# Patient Record
Sex: MALE | Race: WHITE | HISPANIC OR LATINO | Employment: OTHER | URBAN - METROPOLITAN AREA
[De-identification: names, ages, dates, MRNs, and addresses within clinical notes are randomized per-mention and may not be internally consistent; named-entity substitution may affect disease eponyms.]

---

## 2017-02-23 ENCOUNTER — APPOINTMENT (EMERGENCY)
Dept: RADIOLOGY | Facility: HOSPITAL | Age: 72
End: 2017-02-23
Payer: COMMERCIAL

## 2017-02-23 ENCOUNTER — HOSPITAL ENCOUNTER (EMERGENCY)
Facility: HOSPITAL | Age: 72
Discharge: HOME | End: 2017-02-23
Payer: COMMERCIAL

## 2017-02-23 VITALS
RESPIRATION RATE: 17 BRPM | HEIGHT: 68 IN | DIASTOLIC BLOOD PRESSURE: 83 MMHG | WEIGHT: 175 LBS | OXYGEN SATURATION: 100 % | HEART RATE: 85 BPM | SYSTOLIC BLOOD PRESSURE: 167 MMHG | TEMPERATURE: 96.3 F | BODY MASS INDEX: 26.52 KG/M2

## 2017-02-23 DIAGNOSIS — S33.5XXA LUMBAR SPRAIN, INITIAL ENCOUNTER: Primary | ICD-10-CM

## 2017-02-23 PROCEDURE — 99283 EMERGENCY DEPT VISIT LOW MDM: CPT

## 2017-02-23 PROCEDURE — 72100 X-RAY EXAM L-S SPINE 2/3 VWS: CPT

## 2017-02-23 RX ORDER — OXYCODONE HYDROCHLORIDE AND ACETAMINOPHEN 5; 325 MG/1; MG/1
1 TABLET ORAL EVERY 6 HOURS PRN
Qty: 12 TABLET | Refills: 0 | Status: SHIPPED | OUTPATIENT
Start: 2017-02-23 | End: 2017-03-05

## 2017-02-23 RX ORDER — LIDOCAINE 50 MG/G
1 PATCH TOPICAL ONCE
Status: DISCONTINUED | OUTPATIENT
Start: 2017-02-23 | End: 2017-02-23 | Stop reason: HOSPADM

## 2017-02-23 RX ORDER — LIDOCAINE 50 MG/G
1 PATCH TOPICAL EVERY 24 HOURS
Qty: 15 PATCH | Refills: 0 | Status: SHIPPED | OUTPATIENT
Start: 2017-02-23 | End: 2018-04-02

## 2017-02-23 RX ADMIN — LIDOCAINE 1 PATCH: 50 PATCH CUTANEOUS at 09:59

## 2017-08-18 ENCOUNTER — HOSPITAL ENCOUNTER (EMERGENCY)
Facility: HOSPITAL | Age: 72
Discharge: HOME | End: 2017-08-18
Attending: EMERGENCY MEDICINE | Admitting: EMERGENCY MEDICINE
Payer: COMMERCIAL

## 2017-08-18 ENCOUNTER — APPOINTMENT (EMERGENCY)
Dept: RADIOLOGY | Facility: HOSPITAL | Age: 72
End: 2017-08-18
Payer: COMMERCIAL

## 2017-08-18 VITALS
DIASTOLIC BLOOD PRESSURE: 70 MMHG | HEART RATE: 80 BPM | BODY MASS INDEX: 27.28 KG/M2 | RESPIRATION RATE: 18 BRPM | OXYGEN SATURATION: 98 % | SYSTOLIC BLOOD PRESSURE: 163 MMHG | HEIGHT: 68 IN | WEIGHT: 180 LBS | TEMPERATURE: 96.4 F

## 2017-08-18 DIAGNOSIS — M25.569 KNEE PAIN: Primary | ICD-10-CM

## 2017-08-18 PROCEDURE — 73564 X-RAY EXAM KNEE 4 OR MORE: CPT | Performed by: EMERGENCY MEDICINE

## 2017-08-18 PROCEDURE — 99283 EMERGENCY DEPT VISIT LOW MDM: CPT

## 2017-08-18 RX ORDER — KETOROLAC TROMETHAMINE 30 MG/ML
15 INJECTION, SOLUTION INTRAMUSCULAR; INTRAVENOUS ONCE
Status: DISCONTINUED | OUTPATIENT
Start: 2017-08-18 | End: 2017-08-18 | Stop reason: HOSPADM

## 2018-04-02 ENCOUNTER — HOSPITAL ENCOUNTER (EMERGENCY)
Facility: HOSPITAL | Age: 73
Discharge: HOME | End: 2018-04-02
Payer: COMMERCIAL

## 2018-04-02 VITALS
HEART RATE: 83 BPM | RESPIRATION RATE: 18 BRPM | BODY MASS INDEX: 27.37 KG/M2 | DIASTOLIC BLOOD PRESSURE: 85 MMHG | SYSTOLIC BLOOD PRESSURE: 175 MMHG | TEMPERATURE: 97.4 F | OXYGEN SATURATION: 98 % | WEIGHT: 180 LBS

## 2018-04-02 DIAGNOSIS — L03.90 CELLULITIS: ICD-10-CM

## 2018-04-02 DIAGNOSIS — L02.91 ABSCESS: Primary | ICD-10-CM

## 2018-04-02 PROCEDURE — 99282 EMERGENCY DEPT VISIT SF MDM: CPT

## 2018-04-02 RX ORDER — CEPHALEXIN 500 MG/1
500 CAPSULE ORAL 4 TIMES DAILY
Qty: 28 CAPSULE | Refills: 0 | Status: SHIPPED | OUTPATIENT
Start: 2018-04-02 | End: 2018-04-04

## 2018-04-02 RX ORDER — SULFAMETHOXAZOLE AND TRIMETHOPRIM 800; 160 MG/1; MG/1
1 TABLET ORAL 2 TIMES DAILY
Qty: 14 TABLET | Refills: 0 | Status: SHIPPED | OUTPATIENT
Start: 2018-04-02 | End: 2018-04-04

## 2018-04-02 NOTE — DISCHARGE INSTRUCTIONS
Abscess   WHAT YOU NEED TO KNOW:   A warm compress may help your abscess drain  Your healthcare provider may make a cut in the abscess so it can drain  You may need surgery to remove an abscess that is on your hands or buttocks  DISCHARGE INSTRUCTIONS:   Return to the emergency department if:   · The area around your abscess becomes very painful, warm, or has red streaks  · You have a fever and chills  · Your heart is beating faster than usual      · You feel faint or confused  Contact your healthcare provider if:   · Your abscess gets bigger or does not get better  · Your abscess returns  · You have questions or concerns about your condition or care  Medicines: You may  need any of the following:  · Antibiotics  help treat a bacterial infection  · Acetaminophen  decreases pain and fever  It is available without a doctor's order  Ask how much to take and how often to take it  Follow directions  Acetaminophen can cause liver damage if not taken correctly  · NSAIDs , such as ibuprofen, help decrease swelling, pain, and fever  This medicine is available with or without a doctor's order  NSAIDs can cause stomach bleeding or kidney problems in certain people  If you take blood thinner medicine, always ask your healthcare provider if NSAIDs are safe for you  Always read the medicine label and follow directions  · Take your medicine as directed  Contact your healthcare provider if you think your medicine is not helping or if you have side effects  Tell him or her if you are allergic to any medicine  Keep a list of the medicines, vitamins, and herbs you take  Include the amounts, and when and why you take them  Bring the list or the pill bottles to follow-up visits  Carry your medicine list with you in case of an emergency  Self-care:   · Apply a warm compress to your abscess  This will help it open and drain  Wet a washcloth in warm, but not hot, water  Apply the compress for 10 minutes  Repeat this 4 times each day  Do not  press on an abscess or try to open it with a needle  You may push the bacteria deeper or into your blood  · Do not share your clothes, towels, or sheets with anyone  This can spread the infection to others  · Wash your hands often  This can help prevent the spread of germs  Use soap and water or an alcohol-based hand rub  Care for your wound after it is drained:   · Care for your wound as directed  If your healthcare provider says it is okay, carefully remove the bandage and gauze packing  You may need to soak the gauze to get it out of your wound  Clean your wound and the area around it as directed  Dry the area and put on new, clean bandages  Change your bandages when they get wet or dirty  · Ask your healthcare provider how to change the gauze in your wound  Keep track of how many pieces of gauze are placed inside the wound  Do not put too much packing in the wound  Do not pack the gauze too tightly in your wound  Follow up with your healthcare provider in 1 to 3 days: You may need to have your packing removed or your bandage changed  Write down your questions so you remember to ask them during your visits  © 2017 [facility] Information is for End User's use only and may not be sold, redistributed or otherwise used for commercial purposes  All illustrations and images included in CareNotes® are the copyrighted property of [employer] M [employer]  or [employer]  The above information is an  only  It is not intended as medical advice for individual conditions or treatments  Talk to your doctor, nurse or pharmacist before following any medical regimen to see if it is safe and effective for you  Cellulitis   WHAT YOU NEED TO KNOW:   Cellulitis is a skin infection caused by bacteria  Cellulitis may go away on its own or you may need treatment   Your healthcare provider may draw a [place] around the outside edges of your cellulitis  If your cellulitis spreads, your healthcare provider will see it outside of the [place]  DISCHARGE INSTRUCTIONS:   Call 911 if:   · You have sudden trouble breathing or chest pain  Return to the emergency department if:   · Your wound gets larger and more painful  · You feel a crackling under your skin when you touch it  · You have purple dots or bumps on your skin, or you see bleeding under your skin  · You have new swelling and pain in your legs  · The red, warm, swollen area gets larger  · You see red streaks coming from the infected area  Contact your healthcare provider if:   · You have a fever  · Your fever or pain does not go away or gets worse  · The area does not get smaller after 2 days of antibiotics  · Your skin is flaking or peeling off  · You have questions or concerns about your condition or care  Medicines:   · Antibiotics  help treat the bacterial infection  · NSAIDs , such as ibuprofen, help decrease swelling, pain, and fever  NSAIDs can cause stomach bleeding or kidney problems in certain people  If you take blood thinner medicine, always ask if NSAIDs are safe for you  Always read the medicine label and follow directions  Do not give these medicines to children under 10months of age without direction from your child's healthcare provider  · Acetaminophen  decreases pain and fever  It is available without a doctor's order  Ask how much to take and how often to take it  Follow directions  Read the labels of all other medicines you are using to see if they also contain acetaminophen, or ask your doctor or pharmacist  Acetaminophen can cause liver damage if not taken correctly  Do not use more than 4 grams (4,000 milligrams) total of acetaminophen in one day  · Take your medicine as directed  Contact your healthcare provider if you think your medicine is not helping or if you have side effects   Tell him or her if you are allergic to any medicine  Keep a list of the medicines, vitamins, and herbs you take  Include the amounts, and when and why you take them  Bring the list or the pill bottles to follow-up visits  Carry your medicine list with you in case of an emergency  Self-care:   · Elevate the area above the level of your heart  as often as you can  This will help decrease swelling and pain  Prop the area on pillows or blankets to keep it elevated comfortably  · Clean the area daily until the wound scabs over  Gently wash the area with soap and water  Pat dry  Use dressings as directed  · Place cool or warm, wet cloths on the area as directed  Use clean cloths and clean water  Leave it on the area until the cloth is room temperature  Pat the area dry with a clean, dry cloth  The cloths may help decrease pain  Prevent cellulitis:   · Do not scratch bug bites or areas of injury  You increase your risk for cellulitis by scratching these areas  · Do not share personal items, such as towels, clothing, and razors  · Clean exercise equipment  with germ-killing  before and after you use it  · Wash your hands often  Use soap and water  Wash your hands after you use the bathroom, change a child's diapers, or sneeze  Wash your hands before you prepare or eat food  Use lotion to prevent dry, cracked skin  · Wear pressure stockings as directed  You may be told to wear the stockings if you have peripheral edema  The stockings improve blood flow and decrease swelling  · Treat athlete's foot  This can help prevent the spread of a bacterial skin infection  Follow up with your healthcare provider within 3 days, or as directed: Your healthcare provider will check if your cellulitis is getting better  You may need different medicine  Write down your questions so you remember to ask them during your visits    © 2017 [address] [employer] Information is for End User's use only and may not be sold, redistributed or otherwise used for commercial purposes  All illustrations and images included in CareNotes® are the copyrighted property of A D A M , Inc  or [employer]  The above information is an  only  It is not intended as medical advice for individual conditions or treatments  Talk to your doctor, nurse or pharmacist before following any medical regimen to see if it is safe and effective for you

## 2018-04-02 NOTE — ED PROVIDER NOTES
History  Chief Complaint   Patient presents with    Abscess     Pt has an abscess on the left side of his head , pt states that it has been there for aprox 1 week and he has tried to pop it but now it looks like it is getting infected        History provided by:  Patient  Abscess   Location:  Face  Facial abscess location:  L cheek  Size:  1 5  Abscess quality: induration, painful, redness and warmth    Red streaking: no    Duration:  1 week  Progression:  Worsening  Pain details:     Quality:  Sharp    Severity:  Moderate    Duration:  2 days    Timing:  Constant    Progression:  Worsening  Chronicity:  New  Context: not diabetes, not injected drug use, not insect bite/sting and not skin injury    Context comment:  Pimple  Relieved by:  NSAIDs  Worsened by:  Draining/squeezing  Ineffective treatments:  None tried  Associated symptoms: no anorexia, no fatigue, no fever, no headaches, no nausea and no vomiting        Prior to Admission Medications   Prescriptions Last Dose Informant Patient Reported? Taking?   aspirin 81 MG tablet   Yes Yes   Sig: Take 81 mg by mouth daily      Facility-Administered Medications: None       Past Medical History:   Diagnosis Date    Hyperlipidemia        History reviewed  No pertinent surgical history  History reviewed  No pertinent family history  I have reviewed and agree with the history as documented  Social History   Substance Use Topics    Smoking status: Never Smoker    Smokeless tobacco: Not on file    Alcohol use No        Review of Systems   Constitutional: Negative for activity change, appetite change, chills, fatigue and fever  HENT: Negative for ear pain, sneezing and sore throat  Eyes: Negative for pain and visual disturbance  Respiratory: Negative for cough and shortness of breath  Cardiovascular: Negative for chest pain and palpitations     Gastrointestinal: Negative for abdominal pain, anorexia, blood in stool, constipation, diarrhea, nausea and vomiting  Genitourinary: Negative for dysuria and hematuria  Musculoskeletal: Negative for arthralgias, neck pain and neck stiffness  Skin: Positive for wound  Negative for rash  Neurological: Negative for dizziness, weakness, light-headedness, numbness and headaches  All other systems reviewed and are negative  Physical Exam  ED Triage Vitals [04/02/18 1623]   Temperature Pulse Respirations Blood Pressure SpO2   (!) 97 4 °F (36 3 °C) 83 18 (!) 175/85 98 %      Temp Source Heart Rate Source Patient Position - Orthostatic VS BP Location FiO2 (%)   Oral Monitor Sitting Left arm --      Pain Score       8           Orthostatic Vital Signs  Vitals:    04/02/18 1623   BP: (!) 175/85   Pulse: 83   Patient Position - Orthostatic VS: Sitting       Physical Exam   Constitutional: He is oriented to person, place, and time  He appears well-developed and well-nourished  No distress  HENT:   Head: Normocephalic and atraumatic  Nose: Nose normal    Eyes: Conjunctivae and EOM are normal  Pupils are equal, round, and reactive to light  Full EOMS without any lid lag, strabismus, or painful eye movement   Neck: Normal range of motion  Neck supple  Cardiovascular: Normal rate, regular rhythm, normal heart sounds and intact distal pulses  Exam reveals no gallop and no friction rub  No murmur heard  Pulmonary/Chest: Effort normal and breath sounds normal  No respiratory distress  He has no wheezes  He has no rales  Abdominal: Soft  He exhibits no distension  There is no tenderness  Musculoskeletal: Normal range of motion  He exhibits no edema, tenderness or deformity  Lymphadenopathy:     He has no cervical adenopathy  Neurological: He is alert and oriented to person, place, and time  He displays normal reflexes  No cranial nerve deficit or sensory deficit  Coordination normal    Skin: Skin is warm and dry  Capillary refill takes less than 2 seconds  He is not diaphoretic  There is erythema   No pallor  Left lateral face abscess, close to hairline noted with surrounding cellulitis  Well indurated, no fluctulance noted  Tender to palpation  No currently draining  Nursing note and vitals reviewed  ED Medications  Medications - No data to display    Diagnostic Studies  Results Reviewed     None                 No orders to display              Procedures  Procedures       Phone Contacts  ED Phone Contact    ED Course  ED Course            Identification of Seniors at [facility] Most Recent Value   (ISAR) Identification of Seniors at Risk   Before the illness or injury that brought you to the Emergency, did you need someone to help you on a regular basis? 0 Filed at: 04/02/2018 1626   In the last 24 hours, have you needed more help than usual?  0 Filed at: 04/02/2018 1626   Have you been hospitalized for one or more nights during the past 6 months? 0 Filed at: 04/02/2018 1626   In general, do you see well?  0 Filed at: 04/02/2018 1626   In general, do you have serious problems with your memory? 0 Filed at: 04/02/2018 1626   Do you take more than three different medications every day?  0 Filed at: 04/02/2018 1626   ISAR Score  0 Filed at: 04/02/2018 1626                          MDM  Number of Diagnoses or Management Options  Abscess: new and does not require workup  Cellulitis: new and does not require workup  Diagnosis management comments: Patient is a 68-year-old male with no significant past medical history presents to the emergency department for evaluation of abscess  Left side of his face  He states he tried popping it this been squeezing it daily  He states for last 2 days he states his has gotten bigger and the area of redness surrounding the abscess  Patient states that he has not any fevers, chills, sweats  No change in his vision especially in nearby eye  Patient has not been on any antibiotics the last 3 months  Patient no other medical history    On exam patient does have an abscess noted on the left side of his face close to his hairline it is very indurated palpation without any fluctuance  On ultrasound, there is no abscess noted underneath the skin or fluid collection  At this time I do believe this is an abscess without any drainable fluid collection beneath  Will treat with Keflex Bactrim at patient follow up with PCP in 3 days for wound check  If patient able to follow up with his PCP should return emergency department for wound check in 3 days  Patient educated on signs of worsening condition strict return precautions emergency department  Risk of Complications, Morbidity, and/or Mortality  Presenting problems: low  Diagnostic procedures: minimal  Management options: moderate    Patient Progress  Patient progress: stable    CritCare Time    Disposition  Final diagnoses:   Abscess   Cellulitis     Time reflects when diagnosis was documented in both MDM as applicable and the Disposition within this note     Time User Action Codes Description Comment    4/2/2018  4:46 PM Mimi Thomas Add [L02 91] Abscess     4/2/2018  4:47 PM Aman Khan Add [L03 90] Cellulitis       ED Disposition     ED Disposition Condition Comment    Discharge  [address] discharge to home/self care      Condition at discharge: Stable        Follow-up Information     Follow up With Specialties Details Why Contact Info Additional [address], DO Internal Medicine Schedule an appointment as soon as possible for a visit in 3 days For wound re-check [address]       [address] Emergency Department Emergency Medicine  If symptoms worsen [address]  [phone]  ED, [address] [place] [address], [address]        Discharge Medication List as of 4/2/2018  4:53 PM      START taking these medications    Details   cephalexin (KEFLEX) 500 mg capsule Take 1 capsule (500 mg total) by mouth 4 (four) times a day for 7 days, Starting Mon 4/2/2018, Until Mon 4/9/2018, Print      sulfamethoxazole-trimethoprim (BACTRIM DS) 800-160 mg per tablet Take 1 tablet by mouth 2 (two) times a day for 7 days smx-tmp DS (BACTRIM) 800-160 mg tabs (1tab q12 D10), Starting Mon 4/2/2018, Until Mon 4/9/2018, Print         CONTINUE these medications which have NOT CHANGED    Details   aspirin 81 MG tablet Take 81 mg by mouth daily, Until Discontinued, Historical Med           No discharge procedures on file      ED Provider  Electronically Signed by           Jo Ann Mensah PA-C  04/02/18 8533

## 2018-04-04 ENCOUNTER — HOSPITAL ENCOUNTER (EMERGENCY)
Facility: HOSPITAL | Age: 73
Discharge: HOME | End: 2018-04-04
Attending: EMERGENCY MEDICINE | Admitting: EMERGENCY MEDICINE
Payer: COMMERCIAL

## 2018-04-04 VITALS
TEMPERATURE: 98.6 F | HEIGHT: 69 IN | WEIGHT: 185 LBS | DIASTOLIC BLOOD PRESSURE: 75 MMHG | HEART RATE: 90 BPM | OXYGEN SATURATION: 99 % | RESPIRATION RATE: 20 BRPM | SYSTOLIC BLOOD PRESSURE: 153 MMHG | BODY MASS INDEX: 27.4 KG/M2

## 2018-04-04 DIAGNOSIS — L03.90 CELLULITIS: Primary | ICD-10-CM

## 2018-04-04 PROCEDURE — 99282 EMERGENCY DEPT VISIT SF MDM: CPT

## 2018-04-04 RX ORDER — CLINDAMYCIN HYDROCHLORIDE 150 MG/1
150 CAPSULE ORAL EVERY 6 HOURS
Qty: 28 CAPSULE | Refills: 0 | Status: SHIPPED | OUTPATIENT
Start: 2018-04-04 | End: 2018-04-11

## 2018-04-04 RX ORDER — CEPHALEXIN 500 MG/1
500 CAPSULE ORAL 4 TIMES DAILY
COMMUNITY

## 2018-04-04 NOTE — ED NOTES
Pt refusing to get changed into a hospital gown and sit in the stretcher at this time  Pt states he is having an allergic reaction to his abx previously prescribed for a localized sore located on his L temple area       Kerri Davis RN  04/04/18 5408

## 2018-04-04 NOTE — DISCHARGE INSTRUCTIONS
Cellulitis, Ambulatory Care   GENERAL INFORMATION:   Cellulitis  is a skin infection caused by bacteria  Common symptoms include the following:   · Fever    · A red, warm, swollen area on your skin    · Pain when the area is touched    · Bumps or blisters (abscess) that may drain pus    · Bumpy, raised skin that feels like an orange peel  Seek immediate care for the following symptoms:   · An increase in pain, redness, warmth, and size    · Red streaks coming from the infected area    · A thin, gray-brown discharge coming from your infected skin area    · A crackling under your skin when you touch it    · Purple dots or bumps on your skin, or bleeding under your skin    · New swelling and pain in your legs    · Sudden trouble breathing or chest pain  Treatment for cellulitis  may include medicines to treat the bacterial infection or decrease pain  The infection may need to be cleaned out  Damaged, dead, or infected tissue may need to be cut away to help your wound heal   Manage your symptoms:   · Elevate your wound above the level of your heart  as often as you can  This will help decrease swelling and pain  Prop your wound on pillows or blankets to keep it elevated comfortably  · Clean your wound as directed  You may need to wash the wound with soap and water  Look for signs of infection  · Wear pressure stockings as directed  The stockings are tight and put pressure on your legs  This improves blood flow and decreases swelling  Prevent cellulitis:   · Wash your hands often  Use soap and water  Wash your hands after you use the bathroom, change a child's diapers, or sneeze  Wash your hands before you prepare or eat food  Use lotion to prevent dry, cracked skin  · Do not share personal items, such as towels, clothing, and razors  · Clean exercise equipment  with germ-killing  before and after you use it    Follow up with your healthcare provider as directed:  Write down your questions so you remember to ask them during your visits  CARE AGREEMENT:   You have the right to help plan your care  Learn about your health condition and how it may be treated  Discuss treatment options with your caregivers to decide what care you want to receive  You always have the right to refuse treatment  The above information is an  only  It is not intended as medical advice for individual conditions or treatments  Talk to your doctor, nurse or pharmacist before following any medical regimen to see if it is safe and effective for you  © 2014 [address] is for End User's use only and may not be sold, redistributed or otherwise used for commercial purposes  All illustrations and images included in CareNotes® are the copyrighted property of [employer]  or [employer]     stop taking the   Bactrim trimethoprim sulfa   start taking clindamycin   use warm  Compresses to the area   return for worsening symptoms or fever   recheck with family doctor in 48 hours

## 2018-04-04 NOTE — ED PROVIDER NOTES
Emergency Department Note- Wesley Dodd 67 y o  male MRN: [id number]    Unit/Bed#: ED 16 Encounter: [id number]        History of Present Illness   HPI:  Wesley Dodd is a 67 y o  male who presents with edema and swelling around his left lower eye area  The patient was seen 2 days ago for a small draining abscess on his left temporal area he was placed on both Keflex and Bactrim he has taken 2 doses of Bactrim and he is taking the Keflex for 24 hours  Patient states that the swelling around the eye occurred after taking Bactrim and he is convinced that this is a side effect  He has no other rash or hives or pruritus  Patient denies headache or fever or chills  Examination reveals a small area of cellulitis the wound is open there is no active drainage there is no fluctuance noted and no crepitation noted  There is mild periorbital edema on the left that is not cellulitic appearing    Review of Systems  REVIEW OF SYSTEMS  Constitutional:  denies fever, chills, no weight loss   Eyes:  Denies visual changes, denies eye pain    HENT:  Denies nasal congestion or sore throat   Respiratory:  Denies cough or shortness of breath, denies hemoptysis    Cardiovascular:  Denies chest pain, palpitations, or leg edema    GI:  Denies abdominal pain, nausea, vomiting, bloody stools, melena, or diarrhea   :  Denies dysuria, hematuria, polyuria   Musculoskeletal:  Denies back pain or joint pain   Integument:  Denies rash, denies color change    Neurologic:  Denies headache, focal weakness or sensory changes   Endocrine:  Denies polyuria or polydipsia   Lymphatic:  Denies swollen glands   Psychiatric:  Denies depression or anxiety     All system reviewed and negative except as noted above or in HPI    Historical Information   Past Medical History:   Diagnosis Date    Hyperlipidemia      History reviewed  No pertinent surgical history    Social History   History   Alcohol Use No     History   Drug Use No     History Smoking Status    Never Smoker   Smokeless Tobacco    Not on file     Family History: History reviewed  No pertinent family history  Meds/Allergies     (Not in a hospital admission)  Allergies   Allergen Reactions    Keflex [Cephalexin] Eye Swelling       Objective   Vitals: Blood pressure 153/75, pulse 90, temperature 98 6 °F (37 °C), temperature source Oral, resp  rate 20, height 5' 9" (1 753 m), weight 83 9 kg (185 lb), SpO2 99 %  PHYSICAL EXAM  Constitutional:  Well developed, well nourished, no acute distress, non toxic appearance   Eyes:   PERRL, EOMI, conjunctiva normal,  sclera anicteric, no proptosis   There is mild periorbital chemosis and mild periorbital swelling with no evidence of cellulitis throat is clear  HENT:  Atraumatic, external ears normal, nose normal, oropharynx moist, no pharyngeal exudates  Neck  no JVD, no bruits,  normal range of motion, no tenderness, supple, thyroid normal    Respiratory:  No respiratory distress, normal breath sounds B/L, no rales, no wheezing     Cardiovascular:  NSR, no M/G/R  GI:  Soft,  Normal BS,  ND,  NT,  No mass or bruits   :  No costovertebral angle tenderness   Extremities: No edema, no tenderness, no deformities  Normal pulses   Musculoskeletal:   Back - no midline tenderness,  FROM  Upper and lower extremities   SKIN:   no rash, warm and dry   No hives are noted  Neurologic:  Alert & oriented x 3, CN 2-12 intact, normal motor function, normal sensory function, no focal deficits noted, gait normal   Psychiatric:  Speech and behavior appropriate normal judgement and insight      Lab Results: Lab Results: I have personally reviewed pertinent lab results  Labs Reviewed - No data to display  Imaging: I have personally reviewed pertinent reports  No orders to display     EKG, Pathology, and Other Studies: I have personally reviewed pertinent films in PACS  ED Course        Assessment/Plan     ED Medical Decision Makin   Cellulitis Daren Dia MD  04/04/18 7289

## 2019-03-19 ENCOUNTER — HOSPITAL ENCOUNTER (EMERGENCY)
Facility: HOSPITAL | Age: 74
Discharge: HOME | End: 2019-03-19
Attending: EMERGENCY MEDICINE
Payer: COMMERCIAL

## 2019-03-19 VITALS
TEMPERATURE: 98.1 F | DIASTOLIC BLOOD PRESSURE: 82 MMHG | HEIGHT: 68 IN | BODY MASS INDEX: 27.28 KG/M2 | HEART RATE: 71 BPM | OXYGEN SATURATION: 98 % | SYSTOLIC BLOOD PRESSURE: 180 MMHG | WEIGHT: 180 LBS

## 2019-03-19 DIAGNOSIS — M54.50 ACUTE RIGHT-SIDED LOW BACK PAIN WITHOUT SCIATICA: Primary | ICD-10-CM

## 2019-03-19 PROCEDURE — 99283 EMERGENCY DEPT VISIT LOW MDM: CPT

## 2019-03-19 PROCEDURE — 96372 THER/PROPH/DIAG INJ SC/IM: CPT

## 2019-03-19 RX ORDER — METHOCARBAMOL 500 MG/1
500 TABLET, FILM COATED ORAL 2 TIMES DAILY
Qty: 20 TABLET | Refills: 0 | Status: SHIPPED | OUTPATIENT
Start: 2019-03-19

## 2019-03-19 RX ORDER — KETOROLAC TROMETHAMINE 30 MG/ML
15 INJECTION, SOLUTION INTRAMUSCULAR; INTRAVENOUS ONCE
Status: COMPLETED | OUTPATIENT
Start: 2019-03-19 | End: 2019-03-19

## 2019-03-19 RX ORDER — ACETAMINOPHEN 325 MG/1
975 TABLET ORAL ONCE
Status: COMPLETED | OUTPATIENT
Start: 2019-03-19 | End: 2019-03-19

## 2019-03-19 RX ORDER — LIDOCAINE 50 MG/G
1 PATCH TOPICAL ONCE
Status: DISCONTINUED | OUTPATIENT
Start: 2019-03-19 | End: 2019-03-20 | Stop reason: HOSPADM

## 2019-03-19 RX ADMIN — KETOROLAC TROMETHAMINE 15 MG: 30 INJECTION, SOLUTION INTRAMUSCULAR; INTRAVENOUS at 21:56

## 2019-03-19 RX ADMIN — LIDOCAINE 1 PATCH: 50 PATCH CUTANEOUS at 21:55

## 2019-03-19 RX ADMIN — ACETAMINOPHEN 975 MG: 325 TABLET ORAL at 21:55

## 2019-03-20 NOTE — ED ATTENDING ATTESTATION
Estrella Pedraza MD, saw and evaluated the patient  All available labs and X-rays were ordered by me or the resident and have been reviewed by myself  I discussed the patient with the resident / non-physician and agree with the resident's / non-physician practitioner's findings and plan as documented in the resident's / non-physician practicitioner's note, except where noted  At this point, I agree with the current assessment done in the ED  I was present during key portions of all procedures performed unless otherwise stated  Chief Complaint   Patient presents with    Back Pain     pt c/o low back pain which he has had in the past  denies trouble urinating     This is a  71-year-old male presenting for evaluation of lower back pain  The patient states that for the past day or so he has been having lower back pain in the left lower lateral paraspinal region  It is not midline  There is no radiation  It is worse with movement, better with rest, worse if he is walking  Denies taking any medications for it  Denies any fevers night sweats chills nausea vomiting chest pain shortness of breath  No radiation of the pain to other places  No numbness tingling distally  No bowel or bladder incontinence  He remembers waking up with the pain, it has been stable since onset  Denies any saddle anesthesia or changes in wiping with toilet paper  Denies any difficulty walking apart from producing pain  Because of the continued symptoms he came in for evaluation  PE:  Vitals:    03/19/19 2111   BP: (!) 180/82   BP Location: Right arm   Pulse: 71   Temp: 98 1 °F (36 7 °C)   TempSrc: Oral   SpO2: 98%   Weight: 81 6 kg (180 lb)   Height: 5' 8" (1 727 m)   General: VS reviewed  Appears in NAD  awake, alert  Well-nourished, well-developed  Appears stated age  Speaking normally in full sentences  Head: Normocephalic, atraumatic, nontender  Eyes: EOM-I  No diplopia  No hyphema     No subconjunctival hemorrhages  Symmetrical lids  ENT: Atraumatic external nose and ears  MMM  No malocclusion  No stridor  Normal phonation  No drooling  Normal swallowing  Neck: No JVD  CV: No pallor noted  Peripheral pulses +2 throughout  No chest wall tenderness  Lungs:   No tachypnea  No respiratory distress  MSK:   FROM   EHL/FHL/PF/DF/KF/KE/HF/HE 5/5  No saddle anesthesia  2+ patellar reflexes  Tender in the left lower paraspinal region at a single point  There feels like there is a knot there  Skin: Dry, intact  Neuro: Awake, alert, GCS15, CN II-XII grossly intact  Motor grossly intact  Psychiatric/Behavioral: Appropriate mood and affect   Exam: deferred  A:  - paravertebral muscle spasm  P:  - discussed that my concern is that this represents a muscle spasm, will do muscle relaxants, supportive measures  Return to ER precautions discussed orally  He does work as a  and thinks he was lifting a heavy engine part as the cause of his symptoms  He does want to make sure nothing else was going on, reassured the patient he has no questions at this time  - 13 point ROS was performed and all are normal unless stated in the history above  - Nursing note reviewed  Vitals reviewed  - Orders placed by myself and/or advanced practitioner / resident     - Previous chart was reviewed  - No language barrier    - History obtained from patient  - There are no limitations to the history obtained  - Critical care time: Not applicable for this patient  Final Diagnosis:  1  Acute right-sided low back pain without sciatica         Medications   ketorolac (TORADOL) injection 15 mg (15 mg Intramuscular Given 3/19/19 2156)   acetaminophen (TYLENOL) tablet 975 mg (975 mg Oral Given 3/19/19 2155)     No orders to display     No orders of the defined types were placed in this encounter      Labs Reviewed - No data to display  Time reflects when diagnosis was documented in both MDM as applicable and the Disposition within this note     Time User Action Codes Description Comment    3/19/2019 10:15 PM Yari Pickering Add [M54 5] Acute right-sided low back pain without sciatica       ED Disposition     ED Disposition Condition Date/Time Comment    Discharge Stable Tue Mar 19, 2019 10:15 PM Ravi Deedo discharge to home/self care  Follow-up Information    None       Discharge Medication List as of 3/19/2019 10:16 PM      START taking these medications    Details   methocarbamol (ROBAXIN) 500 mg tablet Take 1 tablet (500 mg total) by mouth 2 (two) times a day, Starting Tue 3/19/2019, Print         CONTINUE these medications which have NOT CHANGED    Details   cephalexin (KEFLEX) 500 mg capsule Take 500 mg by mouth 4 (four) times a day, Historical Med           No discharge procedures on file  Prior to Admission Medications   Prescriptions Last Dose Informant Patient Reported? Taking? cephalexin (KEFLEX) 500 mg capsule   Yes No   Sig: Take 500 mg by mouth 4 (four) times a day      Facility-Administered Medications: None       Portions of the record may have been created with voice recognition software  Occasional wrong word or "sound a like" substitutions may have occurred due to the inherent limitations of voice recognition software  Read the chart carefully and recognize, using context, where substitutions have occurred      Electronically signed by:  J Carlos Sharma

## 2019-03-20 NOTE — ED PROVIDER NOTES
History  Chief Complaint   Patient presents with    Back Pain     pt c/o low back pain which he has had in the past  denies trouble urinating     Patient is a 69 yo M who presents for left lumbar back pain  Patient says that the pain has been present for about 1 week  He says that the pain is aching/sharp and non-radiating in nature  He says that the pain is worth with movement, bending  He denies any recent falls  He denies numbness/tingling in his legs, weakness in his legs, saddle anesthesia, bowel/blader incontinence, fevers, chills, hx of cancer  Patient has been ambulating for the past week  He denies CP, SOB, lightheadedness dizziness, chest pain, urinary symptoms  Prior to Admission Medications   Prescriptions Last Dose Informant Patient Reported? Taking? cephalexin (KEFLEX) 500 mg capsule   Yes No   Sig: Take 500 mg by mouth 4 (four) times a day      Facility-Administered Medications: None       Past Medical History:   Diagnosis Date    Hyperlipidemia        History reviewed  No pertinent surgical history  History reviewed  No pertinent family history  I have reviewed and agree with the history as documented  Social History     Tobacco Use    Smoking status: Never Smoker    Smokeless tobacco: Never Used   Substance Use Topics    Alcohol use: No    Drug use: No        Review of Systems   Constitutional: Negative for chills, fever and unexpected weight change  HENT: Negative for congestion, sore throat and trouble swallowing  Eyes: Negative for pain, discharge and itching  Respiratory: Negative for cough, chest tightness, shortness of breath and wheezing  Cardiovascular: Negative for chest pain, palpitations and leg swelling  Gastrointestinal: Negative for abdominal pain, blood in stool, diarrhea, nausea and vomiting  Endocrine: Negative for polyuria  Genitourinary: Negative for difficulty urinating, dysuria, frequency and hematuria     Musculoskeletal: Positive for back pain (L lower)  Negative for arthralgias and neck pain  Skin: Negative for color change and rash  Neurological: Negative for dizziness, syncope, weakness, light-headedness and headaches  Physical Exam  ED Triage Vitals   Temperature Pulse Resp Blood Pressure SpO2   03/19/19 2111 03/19/19 2111 -- 03/19/19 2111 03/19/19 2111   98 1 °F (36 7 °C) 71  (!) 180/82 98 %      Temp Source Heart Rate Source Patient Position - Orthostatic VS BP Location FiO2 (%)   03/19/19 2111 -- -- 03/19/19 2111 --   Oral   Right arm       Pain Score       03/19/19 2113       Worst Possible Pain             Orthostatic Vital Signs  Vitals:    03/19/19 2111   BP: (!) 180/82   Pulse: 71       Physical Exam   Constitutional: He is oriented to person, place, and time  He appears well-developed and well-nourished  No distress  HENT:   Head: Normocephalic and atraumatic  Right Ear: External ear normal    Left Ear: External ear normal    Eyes: Pupils are equal, round, and reactive to light  Conjunctivae and EOM are normal    Neck: Normal range of motion  Cardiovascular: Normal rate, regular rhythm, normal heart sounds and intact distal pulses  Exam reveals no gallop and no friction rub  No murmur heard  Pulmonary/Chest: Effort normal and breath sounds normal  No respiratory distress  He has no wheezes  He has no rales  Abdominal: Soft  Bowel sounds are normal  He exhibits no distension  There is no tenderness  There is no guarding  Musculoskeletal: Normal range of motion  He exhibits tenderness (L lumbar)  He exhibits no edema or deformity  Increased muscle tension in left lumbar area   Lymphadenopathy:     He has no cervical adenopathy  Neurological: He is alert and oriented to person, place, and time  No cranial nerve deficit or sensory deficit  He exhibits normal muscle tone  Skin: Skin is warm and dry  Psychiatric: He has a normal mood and affect   His behavior is normal    Nursing note and vitals reviewed  ED Medications  Medications   ketorolac (TORADOL) injection 15 mg (15 mg Intramuscular Given 3/19/19 2156)   acetaminophen (TYLENOL) tablet 975 mg (975 mg Oral Given 3/19/19 2155)       Diagnostic Studies  Results Reviewed     None                 No orders to display         Procedures  Procedures      Phone Consults  ED Phone Contact    ED Course           Identification of Seniors at Risk      Most Recent Value   (ISAR) Identification of Seniors at Risk   Before the illness or injury that brought you to the Emergency, did you need someone to help you on a regular basis? 0 Filed at: 03/19/2019 2111   In the last 24 hours, have you needed more help than usual?  1 Filed at: 03/19/2019 2111   Have you been hospitalized for one or more nights during the past 6 months? 0 Filed at: 03/19/2019 2111   In general, do you see well?  0 Filed at: 03/19/2019 2111   In general, do you have serious problems with your memory? 0 Filed at: 03/19/2019 2111   Do you take more than three different medications every day?  0 Filed at: 03/19/2019 2111   ISAR Score  1 Filed at: 03/19/2019 2111                          MDM  Number of Diagnoses or Management Options  Acute right-sided low back pain without sciatica:   Diagnosis management comments: 69 yo M presenting for L lumbar back pain  Non radiating  No red flag symptoms  Point tender in L lumbar area w/ increased muscle tension  Patient ambulating past week  Will give Toradol, Lidoderm patch, Tylenol  Patient given script for robaxin         Disposition  Final diagnoses:   Acute right-sided low back pain without sciatica     Time reflects when diagnosis was documented in both MDM as applicable and the Disposition within this note     Time User Action Codes Description Comment    3/19/2019 10:15 PM Burgess Muñoz [M54 5] Acute right-sided low back pain without sciatica       ED Disposition     ED Disposition Condition Date/Time Comment    Discharge Stable Tue Mar 19, 2019 10:15 PM Alexey Lr discharge to home/self care  Follow-up Information    None         Discharge Medication List as of 3/19/2019 10:16 PM      START taking these medications    Details   methocarbamol (ROBAXIN) 500 mg tablet Take 1 tablet (500 mg total) by mouth 2 (two) times a day, Starting Tue 3/19/2019, Print         CONTINUE these medications which have NOT CHANGED    Details   cephalexin (KEFLEX) 500 mg capsule Take 500 mg by mouth 4 (four) times a day, Historical Med           No discharge procedures on file  ED Provider  Attending physically available and evaluated [address]  I managed the patient along with the ED Attending      Electronically Signed by         Yari Uriostegui DO  03/20/19 9796

## 2020-09-22 ENCOUNTER — HOSPITAL ENCOUNTER (EMERGENCY)
Facility: HOSPITAL | Age: 75
Discharge: HOME | End: 2020-09-22
Attending: EMERGENCY MEDICINE | Admitting: EMERGENCY MEDICINE
Payer: COMMERCIAL

## 2020-09-22 VITALS
HEART RATE: 94 BPM | BODY MASS INDEX: 26.61 KG/M2 | WEIGHT: 175 LBS | TEMPERATURE: 97.8 F | SYSTOLIC BLOOD PRESSURE: 173 MMHG | OXYGEN SATURATION: 98 % | RESPIRATION RATE: 18 BRPM | DIASTOLIC BLOOD PRESSURE: 109 MMHG

## 2020-09-22 DIAGNOSIS — M79.674 GREAT TOE PAIN, RIGHT: Primary | ICD-10-CM

## 2020-09-22 PROCEDURE — 99284 EMERGENCY DEPT VISIT MOD MDM: CPT | Performed by: EMERGENCY MEDICINE

## 2020-09-22 PROCEDURE — 99283 EMERGENCY DEPT VISIT LOW MDM: CPT

## 2020-09-22 RX ORDER — OXYCODONE HYDROCHLORIDE AND ACETAMINOPHEN 5; 325 MG/1; MG/1
1 TABLET ORAL EVERY 6 HOURS PRN
Qty: 10 TABLET | Refills: 0 | Status: SHIPPED | OUTPATIENT
Start: 2020-09-22

## 2020-09-22 RX ORDER — PREDNISONE 20 MG/1
40 TABLET ORAL DAILY
Qty: 8 TABLET | Refills: 0 | Status: SHIPPED | OUTPATIENT
Start: 2020-09-22 | End: 2020-09-26

## 2020-09-22 RX ORDER — PREDNISONE 20 MG/1
40 TABLET ORAL ONCE
Status: COMPLETED | OUTPATIENT
Start: 2020-09-22 | End: 2020-09-22

## 2020-09-22 RX ORDER — ASPIRIN 325 MG
325 TABLET ORAL DAILY
COMMUNITY

## 2020-09-22 RX ADMIN — PREDNISONE 40 MG: 20 TABLET ORAL at 20:22

## 2020-09-22 NOTE — ED ATTENDING ATTESTATION
9/22/2020  IJeannine MD, saw and evaluated the patient  I have discussed the patient with the resident/non-physician practitioner and agree with the resident's/non-physician practitioner's findings, Plan of Care, and MDM as documented in the resident's/non-physician practitioner's note, except where noted  All available labs and Radiology studies were reviewed  I was present for key portions of any procedure(s) performed by the resident/non-physician practitioner and I was immediately available to provide assistance  At this point I agree with the current assessment done in the Emergency Department    I have conducted an independent evaluation of this patient a history and physical is as follows:  Pt started with toe pain for 5 days Son has history of gout pt has never had this Pt denies fevers or trauma PE: alert  Great toe with mild redness warmth tender mtp   Good pulses and sensation MDM: will attempt to  Aspirate treat for gout  ED Course         Critical Care Time  Procedures

## 2020-09-22 NOTE — ED PROVIDER NOTES
History  Chief Complaint   Patient presents with    Foot Pain     pt reports right foot pain for 5 days with redness at the base of the great toe     Patient is a 22-year-old male with no significant past medical history who presents to the ED for right great toe pain that started about 5 days ago  He states the pain started gradually  He denies any recent trauma or injuries to that toe  The pain is worse with movement, as well as with walking  He has tried taking ibuprofen and aspirin with minimal relief  He notes that a couple days ago, that toe started to become red  He denies any prior history of the symptoms  He denies any fever, chills, chest pain, shortness of breath, joint stiffness, rashes, or any other new symptoms  History provided by:  Patient   used: No    Toe Pain   Location:  Right great toe  Severity:  Moderate  Onset quality:  Gradual  Duration:  5 days  Timing:  Constant  Progression:  Worsening  Chronicity:  New  Associated symptoms: no abdominal pain, no chest pain, no cough, no diarrhea, no fever, no headaches, no myalgias, no nausea, no rash, no shortness of breath, no sore throat and no vomiting        Prior to Admission Medications   Prescriptions Last Dose Informant Patient Reported? Taking?   aspirin 325 mg tablet   Yes Yes   Sig: Take 325 mg by mouth daily   cephalexin (KEFLEX) 500 mg capsule Not Taking at Unknown time  Yes No   Sig: Take 500 mg by mouth 4 (four) times a day   methocarbamol (ROBAXIN) 500 mg tablet Not Taking at Unknown time  No No   Sig: Take 1 tablet (500 mg total) by mouth 2 (two) times a day   Patient not taking: Reported on 9/22/2020      Facility-Administered Medications: None       Past Medical History:   Diagnosis Date    Hyperlipidemia        History reviewed  No pertinent surgical history  History reviewed  No pertinent family history  I have reviewed and agree with the history as documented      E-Cigarette/Vaping E-Cigarette/Vaping Substances     Social History     Tobacco Use    Smoking status: Never Smoker    Smokeless tobacco: Never Used   Substance Use Topics    Alcohol use: No    Drug use: No        Review of Systems   Constitutional: Negative for chills and fever  HENT: Negative for sore throat and trouble swallowing  Eyes: Negative for redness and itching  Respiratory: Negative for cough and shortness of breath  Cardiovascular: Negative for chest pain and leg swelling  Gastrointestinal: Negative for abdominal pain, diarrhea, nausea and vomiting  Genitourinary: Negative for difficulty urinating, dysuria and hematuria  Musculoskeletal: Positive for gait problem (Secondary to pain)  Negative for back pain, myalgias, neck pain and neck stiffness  Right great toe pain   Skin: Positive for color change ( redness around right great toe)  Negative for rash and wound  Neurological: Negative for headaches  All other systems reviewed and are negative  Physical Exam  ED Triage Vitals [09/22/20 1858]   Temperature Pulse Respirations Blood Pressure SpO2   97 8 °F (36 6 °C) 94 18 (!) 173/109 98 %      Temp Source Heart Rate Source Patient Position - Orthostatic VS BP Location FiO2 (%)   Oral Monitor Sitting Right arm --      Pain Score       --             Orthostatic Vital Signs  Vitals:    09/22/20 1858   BP: (!) 173/109   Pulse: 94   Patient Position - Orthostatic VS: Sitting       Physical Exam  Vitals signs and nursing note reviewed  Constitutional:       General: He is not in acute distress  Appearance: He is well-developed  He is not diaphoretic  HENT:      Head: Normocephalic and atraumatic  Right Ear: External ear normal       Left Ear: External ear normal       Nose: Nose normal    Eyes:      General: Lids are normal  No scleral icterus  Neck:      Musculoskeletal: Normal range of motion and neck supple     Cardiovascular:      Rate and Rhythm: Normal rate and regular rhythm  Heart sounds: Normal heart sounds  No murmur  No friction rub  No gallop  Pulmonary:      Effort: Pulmonary effort is normal  No respiratory distress  Breath sounds: Normal breath sounds  No wheezing or rales  Abdominal:      General: Bowel sounds are normal       Palpations: Abdomen is soft  Tenderness: There is no abdominal tenderness  There is no guarding or rebound  Musculoskeletal: Normal range of motion  General: No deformity  Comments: There is a mild amount of swelling over the lateral right great toe at the MTP joint  There is a small amount of overlying erythema  The area is warm to touch with blanching  There are no obvious deformities  Peripheral pulses are intact  Skin:     General: Skin is warm and dry  Capillary Refill: Capillary refill takes less than 2 seconds  Findings: Erythema (Over right great toe) present  Neurological:      General: No focal deficit present  Mental Status: He is alert  Psychiatric:         Behavior: Behavior normal          ED Medications  Medications   predniSONE tablet 40 mg (40 mg Oral Given 9/22/20 2022)       Diagnostic Studies  Results Reviewed     None                 No orders to display         Procedures  Procedures      ED Course             Identification of Seniors at Risk      Most Recent Value   (ISAR) Identification of Seniors at Risk   Before the illness or injury that brought you to the Emergency, did you need someone to help you on a regular basis? 0 Filed at: 09/22/2020 1902   In the last 24 hours, have you needed more help than usual?  0 Filed at: 09/22/2020 1902   Have you been hospitalized for one or more nights during the past 6 months? 0 Filed at: 09/22/2020 1902   In general, do you see well?  0 Filed at: 09/22/2020 1902   In general, do you have serious problems with your memory? 0 Filed at: 09/22/2020 1902   Do you take more than three different medications every day?   1 Filed at: 09/22/2020 1902   ISAR Score  1 Filed at: 09/22/2020 1902                                [facility]  Number of Diagnoses or Management Options  Great toe pain, right: new and requires workup  Diagnosis management comments:     Patient is a 22-year-old male who presented to the ED for 5 days of worsening right great toe pain  In the ED, vitals were stable  On exam, patient had mild swelling and tenderness to palpation over the right great toe  There was some surrounding erythema  Clinical impression is podagra/gout  A joint aspiration was attempted, but no fluid was obtained  One dose of prednisone given in the ED  Will send prescription for 4 more days of prednisone  In addition, will give script for pain medication  Instructed patient to follow-up with a primary care provider within next several days  Strict return precautions given, including return to the emergency department if he develops fevers, chills, discharge from the toe, worsening pain or redness, or any other new symptoms  Patient verbalized understanding and agreed with plan of care  Risk of Complications, Morbidity, and/or Mortality  Presenting problems: moderate  Diagnostic procedures: moderate  Management options: moderate    Patient Progress  Patient progress: stable        Disposition  Final diagnoses:   Great toe pain, right     Time reflects when diagnosis was documented in both MDM as applicable and the Disposition within this note     Time User Action Codes Description Comment    9/22/2020  8:05 PM [place] Bright Add [M10 9] Podagra     9/22/2020  8:05 PM Astrid Bright Remove [M10 9] Podagra     9/22/2020  8:05 PM Astrid Bright Add [H72 324] Great toe pain, right       ED Disposition     ED Disposition Condition Date/Time Comment    Discharge Stable Tue Sep 22, 2020  8:05 PM [address] discharge to home/self care              Follow-up Information     Follow up With Specialties Details Why Contact Info Additional [address] Emergency Department Emergency Medicine  As needed [address]  [phone]  ED, [address], [facility] 108    [address]  Call in 1 day  [phone]             Discharge Medication List as of 9/22/2020  8:10 PM      START taking these medications    Details   oxyCODONE-acetaminophen (PERCOCET) 5-325 mg per tablet Take 1 tablet by mouth every 6 (six) hours as needed for moderate pain for up to 10 dosesMax Daily Amount: 4 tablets, Starting Tue 9/22/2020, Print      predniSONE 20 mg tablet Take 2 tablets (40 mg total) by mouth daily for 4 days, Starting Tue 9/22/2020, Until Sat 9/26/2020, Normal         CONTINUE these medications which have NOT CHANGED    Details   aspirin 325 mg tablet Take 325 mg by mouth daily, Historical Med      cephalexin (KEFLEX) 500 mg capsule Take 500 mg by mouth 4 (four) times a day, Historical Med      methocarbamol (ROBAXIN) 500 mg tablet Take 1 tablet (500 mg total) by mouth 2 (two) times a day, Starting Tue 3/19/2019, Print           No discharge procedures on file  PDMP Review     None           ED Provider  Attending physically available and evaluated [address]  I managed the patient along with the ED Attending      Electronically Signed by         Valente Hannah DO  09/22/20 [id number]

## 2020-09-23 NOTE — DISCHARGE INSTRUCTIONS
You were seen in the ED for R big to pain  The likely cause of your pain is gout  You were given a dose of steroids in the ED  A prescription was sent to your pharmacy for more steroids  Please take as prescribed  In addition, a pain medication was sent to your pharmacy  Please take as prescribed  Return to the ED for any fevers, chills, new or worsening pain, or any new or worsening symptoms  Please follow up with a primary care provider within 3-5 days  If you do not have a primary care doctor, please call infolink (see above)

## 2021-07-06 ENCOUNTER — VBI (OUTPATIENT)
Dept: ADMINISTRATIVE | Facility: OTHER | Age: 76
End: 2021-07-06